# Patient Record
Sex: FEMALE | Race: WHITE | NOT HISPANIC OR LATINO | Employment: UNEMPLOYED | URBAN - METROPOLITAN AREA
[De-identification: names, ages, dates, MRNs, and addresses within clinical notes are randomized per-mention and may not be internally consistent; named-entity substitution may affect disease eponyms.]

---

## 2023-01-01 ENCOUNTER — PATIENT OUTREACH (OUTPATIENT)
Dept: PRIMARY CARE | Facility: CLINIC | Age: 88
End: 2023-01-01
Payer: MEDICARE

## 2023-01-01 ENCOUNTER — APPOINTMENT (OUTPATIENT)
Dept: PODIATRY | Facility: CLINIC | Age: 88
End: 2023-01-01
Payer: MEDICARE

## 2023-01-01 ENCOUNTER — TELEPHONE (OUTPATIENT)
Dept: PRIMARY CARE | Facility: CLINIC | Age: 88
End: 2023-01-01
Payer: MEDICARE

## 2023-01-01 ENCOUNTER — OFFICE VISIT (OUTPATIENT)
Dept: PRIMARY CARE | Facility: CLINIC | Age: 88
End: 2023-01-01
Payer: MEDICARE

## 2023-01-01 ENCOUNTER — LAB (OUTPATIENT)
Dept: LAB | Facility: LAB | Age: 88
End: 2023-01-01
Payer: MEDICARE

## 2023-01-01 VITALS
SYSTOLIC BLOOD PRESSURE: 118 MMHG | RESPIRATION RATE: 20 BRPM | TEMPERATURE: 97.7 F | HEART RATE: 64 BPM | DIASTOLIC BLOOD PRESSURE: 62 MMHG | BODY MASS INDEX: 24.76 KG/M2 | WEIGHT: 107 LBS | HEIGHT: 55 IN

## 2023-01-01 VITALS
HEART RATE: 62 BPM | WEIGHT: 108 LBS | BODY MASS INDEX: 24.99 KG/M2 | HEIGHT: 55 IN | RESPIRATION RATE: 18 BRPM | DIASTOLIC BLOOD PRESSURE: 70 MMHG | TEMPERATURE: 97.9 F | SYSTOLIC BLOOD PRESSURE: 110 MMHG

## 2023-01-01 DIAGNOSIS — N18.9 ANEMIA DUE TO CHRONIC KIDNEY DISEASE, UNSPECIFIED CKD STAGE: ICD-10-CM

## 2023-01-01 DIAGNOSIS — E55.9 VITAMIN D DEFICIENCY: ICD-10-CM

## 2023-01-01 DIAGNOSIS — Z23 IMMUNIZATION DUE: ICD-10-CM

## 2023-01-01 DIAGNOSIS — E03.9 ACQUIRED HYPOTHYROIDISM: ICD-10-CM

## 2023-01-01 DIAGNOSIS — E53.8 VITAMIN B12 DEFICIENCY: ICD-10-CM

## 2023-01-01 DIAGNOSIS — R09.02 HYPOXIA: Primary | ICD-10-CM

## 2023-01-01 DIAGNOSIS — E44.0 MODERATE PROTEIN-CALORIE MALNUTRITION (MULTI): ICD-10-CM

## 2023-01-01 DIAGNOSIS — D63.1 ANEMIA DUE TO CHRONIC KIDNEY DISEASE, UNSPECIFIED CKD STAGE: ICD-10-CM

## 2023-01-01 DIAGNOSIS — Z00.00 HEALTHCARE MAINTENANCE: ICD-10-CM

## 2023-01-01 DIAGNOSIS — J84.112 IPF (IDIOPATHIC PULMONARY FIBROSIS) (MULTI): ICD-10-CM

## 2023-01-01 DIAGNOSIS — Z00.00 MEDICARE ANNUAL WELLNESS VISIT, SUBSEQUENT: Primary | ICD-10-CM

## 2023-01-01 DIAGNOSIS — I48.11 LONGSTANDING PERSISTENT ATRIAL FIBRILLATION (MULTI): ICD-10-CM

## 2023-01-01 DIAGNOSIS — C49.A2 MALIGNANT GASTROINTESTINAL STROMAL TUMOR (GIST) OF STOMACH (MULTI): ICD-10-CM

## 2023-01-01 DIAGNOSIS — I10 ESSENTIAL HYPERTENSION, BENIGN: ICD-10-CM

## 2023-01-01 DIAGNOSIS — R53.81 PHYSICAL DEBILITY: ICD-10-CM

## 2023-01-01 DIAGNOSIS — I11.0 HYPERTENSIVE HEART DISEASE WITH HEART FAILURE (MULTI): ICD-10-CM

## 2023-01-01 LAB
ALANINE AMINOTRANSFERASE (SGPT) (U/L) IN SER/PLAS: 10 U/L (ref 7–45)
ALBUMIN (G/DL) IN SER/PLAS: 4.3 G/DL (ref 3.4–5)
ALKALINE PHOSPHATASE (U/L) IN SER/PLAS: 56 U/L (ref 33–136)
ANION GAP IN SER/PLAS: 15 MMOL/L (ref 10–20)
ASPARTATE AMINOTRANSFERASE (SGOT) (U/L) IN SER/PLAS: 17 U/L (ref 9–39)
BASOPHILS (10*3/UL) IN BLOOD BY AUTOMATED COUNT: 0.07 X10E9/L (ref 0–0.1)
BASOPHILS/100 LEUKOCYTES IN BLOOD BY AUTOMATED COUNT: 1 % (ref 0–2)
BILIRUBIN TOTAL (MG/DL) IN SER/PLAS: 0.4 MG/DL (ref 0–1.2)
CALCIDIOL (25 OH VITAMIN D3) (NG/ML) IN SER/PLAS: 40 NG/ML
CALCIUM (MG/DL) IN SER/PLAS: 9.4 MG/DL (ref 8.6–10.3)
CARBON DIOXIDE, TOTAL (MMOL/L) IN SER/PLAS: 28 MMOL/L (ref 21–32)
CHLORIDE (MMOL/L) IN SER/PLAS: 105 MMOL/L (ref 98–107)
COBALAMIN (VITAMIN B12) (PG/ML) IN SER/PLAS: 220 PG/ML (ref 211–911)
CREATININE (MG/DL) IN SER/PLAS: 0.92 MG/DL (ref 0.5–1.05)
EOSINOPHILS (10*3/UL) IN BLOOD BY AUTOMATED COUNT: 0.09 X10E9/L (ref 0–0.4)
EOSINOPHILS/100 LEUKOCYTES IN BLOOD BY AUTOMATED COUNT: 1.3 % (ref 0–6)
ERYTHROCYTE DISTRIBUTION WIDTH (RATIO) BY AUTOMATED COUNT: 14.6 % (ref 11.5–14.5)
ERYTHROCYTE MEAN CORPUSCULAR HEMOGLOBIN CONCENTRATION (G/DL) BY AUTOMATED: 31.4 G/DL (ref 32–36)
ERYTHROCYTE MEAN CORPUSCULAR VOLUME (FL) BY AUTOMATED COUNT: 98 FL (ref 80–100)
ERYTHROCYTES (10*6/UL) IN BLOOD BY AUTOMATED COUNT: 4.25 X10E12/L (ref 4–5.2)
GFR FEMALE: 55 ML/MIN/1.73M2
GLUCOSE (MG/DL) IN SER/PLAS: 98 MG/DL (ref 74–99)
HEMATOCRIT (%) IN BLOOD BY AUTOMATED COUNT: 41.7 % (ref 36–46)
HEMOGLOBIN (G/DL) IN BLOOD: 13.1 G/DL (ref 12–16)
HEPATITIS C VIRUS AB PRESENCE IN SERUM: NONREACTIVE
IMMATURE GRANULOCYTES/100 LEUKOCYTES IN BLOOD BY AUTOMATED COUNT: 0.4 % (ref 0–0.9)
LEUKOCYTES (10*3/UL) IN BLOOD BY AUTOMATED COUNT: 6.7 X10E9/L (ref 4.4–11.3)
LYMPHOCYTES (10*3/UL) IN BLOOD BY AUTOMATED COUNT: 1.11 X10E9/L (ref 0.8–3)
LYMPHOCYTES/100 LEUKOCYTES IN BLOOD BY AUTOMATED COUNT: 16.5 % (ref 13–44)
MAGNESIUM (MG/DL) IN SER/PLAS: 2.14 MG/DL (ref 1.6–2.4)
MONOCYTES (10*3/UL) IN BLOOD BY AUTOMATED COUNT: 0.57 X10E9/L (ref 0.05–0.8)
MONOCYTES/100 LEUKOCYTES IN BLOOD BY AUTOMATED COUNT: 8.5 % (ref 2–10)
NEUTROPHILS (10*3/UL) IN BLOOD BY AUTOMATED COUNT: 4.87 X10E9/L (ref 1.6–5.5)
NEUTROPHILS/100 LEUKOCYTES IN BLOOD BY AUTOMATED COUNT: 72.3 % (ref 40–80)
PLATELETS (10*3/UL) IN BLOOD AUTOMATED COUNT: 244 X10E9/L (ref 150–450)
POTASSIUM (MMOL/L) IN SER/PLAS: 4.7 MMOL/L (ref 3.5–5.3)
PROTEIN TOTAL: 6.7 G/DL (ref 6.4–8.2)
SODIUM (MMOL/L) IN SER/PLAS: 143 MMOL/L (ref 136–145)
THYROTROPIN (MIU/L) IN SER/PLAS BY DETECTION LIMIT <= 0.05 MIU/L: 1.13 MIU/L (ref 0.44–3.98)
UREA NITROGEN (MG/DL) IN SER/PLAS: 14 MG/DL (ref 6–23)

## 2023-01-01 PROCEDURE — G0439 PPPS, SUBSEQ VISIT: HCPCS | Performed by: INTERNAL MEDICINE

## 2023-01-01 PROCEDURE — 3078F DIAST BP <80 MM HG: CPT | Performed by: INTERNAL MEDICINE

## 2023-01-01 PROCEDURE — 83735 ASSAY OF MAGNESIUM: CPT

## 2023-01-01 PROCEDURE — G0008 ADMIN INFLUENZA VIRUS VAC: HCPCS | Performed by: INTERNAL MEDICINE

## 2023-01-01 PROCEDURE — 90662 IIV NO PRSV INCREASED AG IM: CPT | Performed by: INTERNAL MEDICINE

## 2023-01-01 PROCEDURE — 1159F MED LIST DOCD IN RCRD: CPT | Performed by: INTERNAL MEDICINE

## 2023-01-01 PROCEDURE — 99214 OFFICE O/P EST MOD 30 MIN: CPT | Performed by: INTERNAL MEDICINE

## 2023-01-01 PROCEDURE — 1157F ADVNC CARE PLAN IN RCRD: CPT | Performed by: INTERNAL MEDICINE

## 2023-01-01 PROCEDURE — 1160F RVW MEDS BY RX/DR IN RCRD: CPT | Performed by: INTERNAL MEDICINE

## 2023-01-01 PROCEDURE — 84443 ASSAY THYROID STIM HORMONE: CPT

## 2023-01-01 PROCEDURE — 80053 COMPREHEN METABOLIC PANEL: CPT

## 2023-01-01 PROCEDURE — 82607 VITAMIN B-12: CPT

## 2023-01-01 PROCEDURE — 1125F AMNT PAIN NOTED PAIN PRSNT: CPT | Performed by: INTERNAL MEDICINE

## 2023-01-01 PROCEDURE — 1170F FXNL STATUS ASSESSED: CPT | Performed by: INTERNAL MEDICINE

## 2023-01-01 PROCEDURE — 85025 COMPLETE CBC W/AUTO DIFF WBC: CPT

## 2023-01-01 PROCEDURE — 82306 VITAMIN D 25 HYDROXY: CPT

## 2023-01-01 PROCEDURE — 3074F SYST BP LT 130 MM HG: CPT | Performed by: INTERNAL MEDICINE

## 2023-01-01 PROCEDURE — 36415 COLL VENOUS BLD VENIPUNCTURE: CPT

## 2023-01-01 PROCEDURE — 1036F TOBACCO NON-USER: CPT | Performed by: INTERNAL MEDICINE

## 2023-01-01 PROCEDURE — 99213 OFFICE O/P EST LOW 20 MIN: CPT | Performed by: INTERNAL MEDICINE

## 2023-01-01 PROCEDURE — 86803 HEPATITIS C AB TEST: CPT

## 2023-01-01 RX ORDER — GUAIFENESIN 600 MG/1
1200 TABLET, EXTENDED RELEASE ORAL 2 TIMES DAILY
COMMUNITY
Start: 2023-01-01 | End: 2023-11-21

## 2023-01-01 RX ORDER — AMLODIPINE BESYLATE 5 MG/1
1 TABLET ORAL DAILY
COMMUNITY
Start: 2022-02-25

## 2023-01-01 RX ORDER — ADHESIVE BANDAGE
BANDAGE TOPICAL
COMMUNITY
Start: 2022-06-22

## 2023-01-01 RX ORDER — IPRATROPIUM BROMIDE AND ALBUTEROL SULFATE 2.5; .5 MG/3ML; MG/3ML
3 SOLUTION RESPIRATORY (INHALATION)
COMMUNITY

## 2023-01-01 RX ORDER — LEVOTHYROXINE SODIUM 50 UG/1
TABLET ORAL
COMMUNITY
Start: 2017-06-21

## 2023-01-01 RX ORDER — BRIMONIDINE TARTRATE 1.5 MG/ML
1 SOLUTION/ DROPS OPHTHALMIC 2 TIMES DAILY
Qty: 15 ML | Refills: 0 | Status: SHIPPED | OUTPATIENT
Start: 2023-01-01 | End: 2023-11-15

## 2023-01-01 RX ORDER — METOPROLOL TARTRATE 25 MG/1
25 TABLET, FILM COATED ORAL EVERY 12 HOURS
COMMUNITY
Start: 2018-06-19

## 2023-01-01 RX ORDER — ALBUTEROL SULFATE 90 UG/1
AEROSOL, METERED RESPIRATORY (INHALATION)
COMMUNITY
Start: 2023-01-01

## 2023-01-01 RX ORDER — AZITHROMYCIN 250 MG/1
250 TABLET, FILM COATED ORAL DAILY
COMMUNITY
Start: 2023-01-01 | End: 2023-01-01

## 2023-01-01 RX ORDER — ACETAMINOPHEN 325 MG/1
TABLET ORAL
COMMUNITY
Start: 2022-06-22

## 2023-01-01 RX ORDER — BIMATOPROST 0.1 MG/ML
1 SOLUTION/ DROPS OPHTHALMIC NIGHTLY
Qty: 2.5 ML | Refills: 1 | Status: SHIPPED | OUTPATIENT
Start: 2023-01-01 | End: 2023-11-15

## 2023-01-01 RX ORDER — BENZONATATE 100 MG/1
100 CAPSULE ORAL 3 TIMES DAILY PRN
COMMUNITY

## 2023-01-01 RX ORDER — BIMATOPROST 0.1 MG/ML
1 SOLUTION/ DROPS OPHTHALMIC NIGHTLY
COMMUNITY
Start: 2023-01-01 | End: 2023-01-01 | Stop reason: SDUPTHER

## 2023-01-01 RX ORDER — TALC
3 POWDER (GRAM) TOPICAL NIGHTLY
COMMUNITY

## 2023-01-01 RX ORDER — CALCIUM CARBONATE 500(1250)
1 TABLET ORAL
COMMUNITY

## 2023-01-01 RX ORDER — VITAMIN B COMPLEX
1 CAPSULE ORAL DAILY
Qty: 30 CAPSULE | Refills: 11 | Status: SHIPPED | OUTPATIENT
Start: 2023-01-01 | End: 2023-11-15

## 2023-01-01 RX ORDER — ACETAMINOPHEN 500 MG
2 TABLET ORAL DAILY
COMMUNITY
Start: 2022-07-21 | End: 2023-01-01 | Stop reason: ALTCHOICE

## 2023-01-01 RX ORDER — ACETAMINOPHEN 500 MG
1 TABLET ORAL DAILY
COMMUNITY
Start: 2022-07-21

## 2023-01-01 RX ORDER — HYDROXYZINE HYDROCHLORIDE 25 MG/1
25 TABLET, FILM COATED ORAL 3 TIMES DAILY
COMMUNITY
Start: 2023-01-01 | End: 2023-11-21

## 2023-01-01 RX ORDER — BRIMONIDINE TARTRATE 1 MG/ML
SOLUTION/ DROPS OPHTHALMIC
COMMUNITY
Start: 2018-10-30

## 2023-01-01 RX ORDER — BROMFENAC SODIUM 0.7 MG/ML
SOLUTION/ DROPS OPHTHALMIC
COMMUNITY
Start: 2023-01-01

## 2023-01-01 RX ORDER — PREDNISONE 10 MG/1
10 TABLET ORAL DAILY
COMMUNITY

## 2023-01-01 RX ORDER — BRIMONIDINE TARTRATE 1.5 MG/ML
SOLUTION/ DROPS OPHTHALMIC
COMMUNITY
Start: 2022-07-01 | End: 2023-01-01 | Stop reason: SDUPTHER

## 2023-01-01 ASSESSMENT — ENCOUNTER SYMPTOMS
FATIGUE: 0
COUGH: 0
PALPITATIONS: 0
VOMITING: 0
CHEST TIGHTNESS: 0
CHILLS: 0
VOMITING: 0
UNEXPECTED WEIGHT CHANGE: 0
CONSTIPATION: 0
CONSTIPATION: 0
ABDOMINAL PAIN: 0
CONFUSION: 0
WHEEZING: 1
ARTHRALGIAS: 0
CONFUSION: 0
DYSURIA: 0
SLEEP DISTURBANCE: 0
NAUSEA: 0
UNEXPECTED WEIGHT CHANGE: 0
COUGH: 1
DIZZINESS: 0
WEAKNESS: 0
DYSURIA: 0
SLEEP DISTURBANCE: 0
CHILLS: 0
NAUSEA: 0
JOINT SWELLING: 0
ADENOPATHY: 0
CHEST TIGHTNESS: 0
ADENOPATHY: 0
DIZZINESS: 0
ARTHRALGIAS: 0
SORE THROAT: 0
SHORTNESS OF BREATH: 1
PALPITATIONS: 0
JOINT SWELLING: 0
DIARRHEA: 0
FATIGUE: 1
SHORTNESS OF BREATH: 0
DIARRHEA: 0
WEAKNESS: 0
ABDOMINAL PAIN: 0
WHEEZING: 0
SORE THROAT: 0
HEADACHES: 0

## 2023-01-01 ASSESSMENT — PATIENT HEALTH QUESTIONNAIRE - PHQ9
SUM OF ALL RESPONSES TO PHQ9 QUESTIONS 1 AND 2: 0
SUM OF ALL RESPONSES TO PHQ9 QUESTIONS 1 AND 2: 0
2. FEELING DOWN, DEPRESSED OR HOPELESS: NOT AT ALL
2. FEELING DOWN, DEPRESSED OR HOPELESS: NOT AT ALL
1. LITTLE INTEREST OR PLEASURE IN DOING THINGS: NOT AT ALL
1. LITTLE INTEREST OR PLEASURE IN DOING THINGS: NOT AT ALL

## 2023-01-01 ASSESSMENT — ACTIVITIES OF DAILY LIVING (ADL)
BATHING: NEEDS ASSISTANCE
DOING_HOUSEWORK: NEEDS ASSISTANCE
DRESSING: INDEPENDENT
GROCERY_SHOPPING: NEEDS ASSISTANCE
TAKING_MEDICATION: NEEDS ASSISTANCE
MANAGING_FINANCES: NEEDS ASSISTANCE

## 2023-06-01 PROBLEM — L50.9 NAUSEA WITH HIVES: Status: ACTIVE | Noted: 2023-01-01

## 2023-06-01 PROBLEM — E53.8 COBALAMIN DEFICIENCY: Status: ACTIVE | Noted: 2023-01-01

## 2023-06-01 PROBLEM — I48.91 ATRIAL FIBRILLATION (MULTI): Status: ACTIVE | Noted: 2023-01-01

## 2023-06-01 PROBLEM — M79.676 TOE PAIN, CHRONIC: Status: ACTIVE | Noted: 2023-01-01

## 2023-06-01 PROBLEM — Z86.79 HISTORY OF CARDIAC ARRHYTHMIA: Status: ACTIVE | Noted: 2023-01-01

## 2023-06-01 PROBLEM — R32 URINARY INCONTINENCE: Status: ACTIVE | Noted: 2023-01-01

## 2023-06-01 PROBLEM — C44.90 SKIN CANCER: Status: ACTIVE | Noted: 2023-01-01

## 2023-06-01 PROBLEM — C44.91 BASAL CELL CARCINOMA: Status: ACTIVE | Noted: 2023-01-01

## 2023-06-01 PROBLEM — S32.000A COMPRESSION FRACTURE OF LUMBAR VERTEBRA (MULTI): Status: ACTIVE | Noted: 2023-01-01

## 2023-06-01 PROBLEM — E78.5 HYPERLIPIDEMIA: Status: RESOLVED | Noted: 2023-01-01 | Resolved: 2023-01-01

## 2023-06-01 PROBLEM — M54.30 SCIATICA: Status: ACTIVE | Noted: 2022-06-21

## 2023-06-01 PROBLEM — M79.674 CHRONIC PAIN OF TOE OF RIGHT FOOT: Status: ACTIVE | Noted: 2023-01-01

## 2023-06-01 PROBLEM — I87.2 VENOUS INSUFFICIENCY: Status: ACTIVE | Noted: 2023-01-01

## 2023-06-01 PROBLEM — L21.9 SEBORRHEIC DERMATITIS: Status: ACTIVE | Noted: 2023-01-01

## 2023-06-01 PROBLEM — K27.9 PUD (PEPTIC ULCER DISEASE): Status: RESOLVED | Noted: 2023-01-01 | Resolved: 2023-01-01

## 2023-06-01 PROBLEM — H35.30 MACULAR DEGENERATION: Status: ACTIVE | Noted: 2023-01-01

## 2023-06-01 PROBLEM — H40.9 GLAUCOMA: Status: ACTIVE | Noted: 2023-01-01

## 2023-06-01 PROBLEM — J84.10 FIBROSIS OF LUNG (MULTI): Status: RESOLVED | Noted: 2023-01-01 | Resolved: 2023-01-01

## 2023-06-01 PROBLEM — M54.32 SCIATICA OF LEFT SIDE: Status: ACTIVE | Noted: 2023-01-01

## 2023-06-01 PROBLEM — R09.02 HYPOXIA: Status: ACTIVE | Noted: 2023-01-01

## 2023-06-01 PROBLEM — I61.9 CVA (CEREBROVASCULAR ACCIDENT DUE TO INTRACEREBRAL HEMORRHAGE) (MULTI): Status: ACTIVE | Noted: 2023-01-01

## 2023-06-01 PROBLEM — N39.0 RECURRENT UTI: Status: RESOLVED | Noted: 2023-01-01 | Resolved: 2023-01-01

## 2023-06-01 PROBLEM — S32.010A COMPRESSION FRACTURE OF L1 LUMBAR VERTEBRA (MULTI): Status: ACTIVE | Noted: 2023-01-01

## 2023-06-01 PROBLEM — I61.9 CEREBRAL HEMORRHAGE (MULTI): Status: ACTIVE | Noted: 2023-01-01

## 2023-06-01 PROBLEM — R06.02 SHORTNESS OF BREATH: Status: ACTIVE | Noted: 2023-01-01

## 2023-06-01 PROBLEM — R42 LIGHTHEADEDNESS: Status: ACTIVE | Noted: 2023-01-01

## 2023-06-01 PROBLEM — D21.4 GIST (GASTROINTESTINAL STROMAL TUMOR), NON-MALIGNANT: Status: ACTIVE | Noted: 2023-01-01

## 2023-06-01 PROBLEM — K27.9 PEPTIC ULCER: Status: RESOLVED | Noted: 2023-01-01 | Resolved: 2023-01-01

## 2023-06-01 PROBLEM — E03.9 HYPOTHYROIDISM: Status: ACTIVE | Noted: 2023-01-01

## 2023-06-01 PROBLEM — G89.29 CHRONIC BACK PAIN: Status: ACTIVE | Noted: 2023-01-01

## 2023-06-01 PROBLEM — E55.9 VITAMIN D DEFICIENCY: Status: ACTIVE | Noted: 2023-01-01

## 2023-06-01 PROBLEM — R53.81 PHYSICAL DEBILITY: Status: ACTIVE | Noted: 2023-01-01

## 2023-06-01 PROBLEM — F41.9 ANXIETY: Status: ACTIVE | Noted: 2023-01-01

## 2023-06-01 PROBLEM — G45.9 TIA (TRANSIENT ISCHEMIC ATTACK): Status: ACTIVE | Noted: 2023-01-01

## 2023-06-01 PROBLEM — N39.0 RECURRENT UTI: Status: ACTIVE | Noted: 2023-01-01

## 2023-06-01 PROBLEM — H40.053 OCULAR HYPERTENSION, BILATERAL: Status: ACTIVE | Noted: 2018-10-20

## 2023-06-01 PROBLEM — L72.0 EPIDERMOID CYST OF SKIN: Status: ACTIVE | Noted: 2023-01-01

## 2023-06-01 PROBLEM — K27.9 PUD (PEPTIC ULCER DISEASE): Status: ACTIVE | Noted: 2023-01-01

## 2023-06-01 PROBLEM — K27.9 PEPTIC ULCER: Status: ACTIVE | Noted: 2023-01-01

## 2023-06-01 PROBLEM — M25.562 ACUTE PAIN OF LEFT KNEE: Status: ACTIVE | Noted: 2023-01-01

## 2023-06-01 PROBLEM — R30.0 DYSURIA: Status: ACTIVE | Noted: 2023-01-01

## 2023-06-01 PROBLEM — R39.9 UTI SYMPTOMS: Status: ACTIVE | Noted: 2023-01-01

## 2023-06-01 PROBLEM — B35.1 ONYCHOMYCOSIS: Status: ACTIVE | Noted: 2023-01-01

## 2023-06-01 PROBLEM — F32.A DEPRESSIVE DISORDER: Status: ACTIVE | Noted: 2023-01-01

## 2023-06-01 PROBLEM — N39.0 RECURRENT URINARY TRACT INFECTION: Status: RESOLVED | Noted: 2023-01-01 | Resolved: 2023-01-01

## 2023-06-01 PROBLEM — D21.4 BENIGN GASTROINTESTINAL STROMAL TUMOR (GIST): Status: ACTIVE | Noted: 2023-01-01

## 2023-06-01 PROBLEM — R04.0 EPISTAXIS: Status: ACTIVE | Noted: 2023-01-01

## 2023-06-01 PROBLEM — R06.2 WHEEZING: Status: ACTIVE | Noted: 2023-01-01

## 2023-06-01 PROBLEM — K44.9 HIATAL HERNIA: Status: ACTIVE | Noted: 2023-01-01

## 2023-06-01 PROBLEM — L72.0 EPIDERMAL CYST: Status: ACTIVE | Noted: 2023-01-01

## 2023-06-01 PROBLEM — G89.29 CHRONIC PAIN OF TOE OF RIGHT FOOT: Status: ACTIVE | Noted: 2023-01-01

## 2023-06-01 PROBLEM — H02.409 PTOSIS: Status: ACTIVE | Noted: 2017-05-31

## 2023-06-01 PROBLEM — G89.29 CHRONIC PAIN OF TOE OF LEFT FOOT: Status: ACTIVE | Noted: 2023-01-01

## 2023-06-01 PROBLEM — H40.023 OAG (OPEN ANGLE GLAUCOMA) SUSPECT, HIGH RISK, BILATERAL: Status: ACTIVE | Noted: 2017-04-12

## 2023-06-01 PROBLEM — R19.5 OCCULT BLOOD IN STOOLS: Status: ACTIVE | Noted: 2023-01-01

## 2023-06-01 PROBLEM — G47.00 INSOMNIA: Status: ACTIVE | Noted: 2023-01-01

## 2023-06-01 PROBLEM — E78.5 DYSLIPIDEMIA: Status: ACTIVE | Noted: 2022-06-21

## 2023-06-01 PROBLEM — M81.0 OSTEOPOROSIS: Status: ACTIVE | Noted: 2023-01-01

## 2023-06-01 PROBLEM — G45.9 TRANSIENT ISCHEMIC ATTACK: Status: ACTIVE | Noted: 2023-01-01

## 2023-06-01 PROBLEM — H01.003 BLEPHARITIS OF EYELIDS OF BOTH EYES DUE TO STAPHYLOCOCCUS: Status: ACTIVE | Noted: 2018-10-20

## 2023-06-01 PROBLEM — R06.89 BREATHING DIFFICULTY: Status: ACTIVE | Noted: 2023-01-01

## 2023-06-01 PROBLEM — E53.8 VITAMIN B12 DEFICIENCY: Status: ACTIVE | Noted: 2023-01-01

## 2023-06-01 PROBLEM — G89.29 TOE PAIN, CHRONIC: Status: ACTIVE | Noted: 2023-01-01

## 2023-06-01 PROBLEM — R06.89 BREATHING DIFFICULTY: Status: RESOLVED | Noted: 2023-01-01 | Resolved: 2023-01-01

## 2023-06-01 PROBLEM — C43.9 MALIGNANT MELANOMA (MULTI): Status: ACTIVE | Noted: 2023-01-01

## 2023-06-01 PROBLEM — D21.4 BENIGN GASTROINTESTINAL STROMAL TUMOR (GIST): Status: RESOLVED | Noted: 2023-01-01 | Resolved: 2023-01-01

## 2023-06-01 PROBLEM — D21.4 GIST (GASTROINTESTINAL STROMAL TUMOR), NON-MALIGNANT: Status: RESOLVED | Noted: 2023-01-01 | Resolved: 2023-01-01

## 2023-06-01 PROBLEM — I51.89 DIASTOLIC DYSFUNCTION: Status: ACTIVE | Noted: 2023-01-01

## 2023-06-01 PROBLEM — I65.29 STENOSIS OF CAROTID ARTERY: Status: ACTIVE | Noted: 2023-01-01

## 2023-06-01 PROBLEM — M19.90 OSTEOARTHRITIS: Status: ACTIVE | Noted: 2023-01-01

## 2023-06-01 PROBLEM — M25.569 KNEE PAIN: Status: ACTIVE | Noted: 2023-01-01

## 2023-06-01 PROBLEM — J84.112 IPF (IDIOPATHIC PULMONARY FIBROSIS) (MULTI): Status: ACTIVE | Noted: 2023-01-01

## 2023-06-01 PROBLEM — L98.9 SKIN LESION: Status: ACTIVE | Noted: 2023-01-01

## 2023-06-01 PROBLEM — L57.0 ACTINIC KERATOSES: Status: ACTIVE | Noted: 2023-01-01

## 2023-06-01 PROBLEM — M40.209 KYPHOSIS: Status: ACTIVE | Noted: 2023-01-01

## 2023-06-01 PROBLEM — K92.1 BLACK STOOL: Status: ACTIVE | Noted: 2023-01-01

## 2023-06-01 PROBLEM — R53.1 WEAKNESS: Status: ACTIVE | Noted: 2023-01-01

## 2023-06-01 PROBLEM — H54.8 LEGAL BLINDNESS, AS DEFINED IN USA: Status: ACTIVE | Noted: 2017-04-12

## 2023-06-01 PROBLEM — R53.83 FATIGUE: Status: ACTIVE | Noted: 2023-01-01

## 2023-06-01 PROBLEM — D64.9 ANEMIA: Status: ACTIVE | Noted: 2023-01-01

## 2023-06-01 PROBLEM — R06.2 WHEEZING: Status: RESOLVED | Noted: 2023-01-01 | Resolved: 2023-01-01

## 2023-06-01 PROBLEM — M17.12 PRIMARY OSTEOARTHRITIS OF LEFT KNEE: Status: ACTIVE | Noted: 2023-01-01

## 2023-06-01 PROBLEM — E78.5 HYPERLIPIDEMIA: Status: ACTIVE | Noted: 2023-01-01

## 2023-06-01 PROBLEM — I34.1 MVP (MITRAL VALVE PROLAPSE): Status: ACTIVE | Noted: 2023-01-01

## 2023-06-01 PROBLEM — B95.8 BLEPHARITIS OF EYELIDS OF BOTH EYES DUE TO STAPHYLOCOCCUS: Status: ACTIVE | Noted: 2018-10-20

## 2023-06-01 PROBLEM — J84.10 FIBROSIS OF LUNG (MULTI): Status: ACTIVE | Noted: 2023-01-01

## 2023-06-01 PROBLEM — R39.9 UTI SYMPTOMS: Status: RESOLVED | Noted: 2023-01-01 | Resolved: 2023-01-01

## 2023-06-01 PROBLEM — M54.9 CHRONIC BACK PAIN: Status: ACTIVE | Noted: 2023-01-01

## 2023-06-01 PROBLEM — R42 DIZZINESS: Status: ACTIVE | Noted: 2023-01-01

## 2023-06-01 PROBLEM — R11.0 NAUSEA: Status: ACTIVE | Noted: 2023-01-01

## 2023-06-01 PROBLEM — D18.01 ANGIOMA OF SKIN: Status: ACTIVE | Noted: 2023-01-01

## 2023-06-01 PROBLEM — H01.006 BLEPHARITIS OF EYELIDS OF BOTH EYES DUE TO STAPHYLOCOCCUS: Status: ACTIVE | Noted: 2018-10-20

## 2023-06-01 PROBLEM — K11.8 MASS OF LEFT PAROTID GLAND: Status: ACTIVE | Noted: 2023-01-01

## 2023-06-01 PROBLEM — H61.20 CERUMEN IMPACTION: Status: ACTIVE | Noted: 2023-01-01

## 2023-06-01 PROBLEM — R11.0 NAUSEA WITH HIVES: Status: ACTIVE | Noted: 2023-01-01

## 2023-06-01 PROBLEM — Z98.890 HISTORY OF SPINAL SURGERY: Status: ACTIVE | Noted: 2023-01-01

## 2023-06-01 PROBLEM — N39.0 RECURRENT URINARY TRACT INFECTION: Status: ACTIVE | Noted: 2023-01-01

## 2023-06-01 PROBLEM — M79.675 CHRONIC PAIN OF TOE OF LEFT FOOT: Status: ACTIVE | Noted: 2023-01-01

## 2023-06-01 PROBLEM — R30.0 DYSURIA: Status: RESOLVED | Noted: 2023-01-01 | Resolved: 2023-01-01

## 2023-06-01 PROBLEM — M25.562 ACUTE PAIN OF LEFT KNEE: Status: RESOLVED | Noted: 2023-01-01 | Resolved: 2023-01-01

## 2023-06-01 NOTE — PROGRESS NOTES
Subjective   Johanna Bowen is a 89 y.o. female who presents for Follow-up (6 months follow up).  6 months follow up   Feeling SOB at times more than before, increase on frequency and severity, using oxygen more often 2.5 L, today oxygen level post walking was 83% after resting 92% at [facility]   Taking more naps in AM and PM then before   More weakness in her back , wants to seat in the Rolator than to push it, and gets SOB and can not keep walking ,might need a wheelchair   Niece Laura is in the room today with patient       Review of Systems   Constitutional:  Positive for fatigue. Negative for chills and unexpected weight change.        Comment   HENT:  Negative for congestion, ear pain and sore throat.    Respiratory:  Positive for cough, shortness of breath and wheezing. Negative for chest tightness.    Cardiovascular:  Negative for palpitations and leg swelling.   Gastrointestinal:  Negative for abdominal pain, constipation, diarrhea, nausea and vomiting.   Genitourinary:  Negative for dysuria and urgency.   Musculoskeletal:  Negative for arthralgias and joint swelling.   Skin:  Negative for rash.   Neurological:  Negative for dizziness, weakness and headaches.   Hematological:  Negative for adenopathy.   Psychiatric/Behavioral:  Negative for confusion and sleep disturbance.        Objective   Physical Exam  Constitutional:       Appearance: Normal appearance.   HENT:      Head: Normocephalic and atraumatic.   Eyes:      Pupils: Pupils are equal, round, and reactive to light.   Cardiovascular:      Rate and Rhythm: Normal rate and regular rhythm.   Pulmonary:      Effort: Pulmonary effort is normal.      Breath sounds: Normal breath sounds.   Musculoskeletal:         General: Normal range of motion.      Cervical back: Normal range of motion and neck supple.   Skin:     General: Skin is warm.   Neurological:      General: No focal deficit present.      Mental Status: She is alert and oriented to person, place,  "and time.   Psychiatric:         Mood and Affect: Mood normal.         Behavior: Behavior normal.       /70 (BP Location: Left arm, Patient Position: Sitting)   Pulse 62   Temp 36.6 °C (97.9 °F)   Resp 18   Ht 1.346 m (4' 5\")   Wt 49 kg (108 lb)   BMI 27.03 kg/m²       Assessment/Plan   Problem List Items Addressed This Visit       Anemia    Relevant Orders    CBC and Auto Differential    Comprehensive Metabolic Panel    Atrial fibrillation (CMS/HCC)     Not on anticoagulation         Relevant Medications    amLODIPine (Norvasc) 5 mg tablet    metoprolol tartrate (Lopressor) 25 mg tablet    Essential hypertension, benign    GIST (gastrointestinal stromal tumor), malignant (CMS/HCC)     Had sx in the past         Hypothyroidism    Relevant Orders    TSH with reflex to Free T4 if abnormal    Magnesium    Hepatitis C Antibody    Hypoxia - Primary    IPF (idiopathic pulmonary fibrosis) (CMS/HCC)     On home o2 , needs to use it more         Moderate protein-calorie malnutrition (CMS/HCC)    Vitamin B12 deficiency    Relevant Orders    Vitamin B12    Vitamin D deficiency    Relevant Orders    Vitamin D, Total     Might need a wheelchair in the future, using a walker with a sit now  "

## 2023-06-29 NOTE — TELEPHONE ENCOUNTER
Dorinda left a vm stating that Pt is having difficulty catching her breath and breathing even with the oxygen.    I called Dorinda back- she did not answer, but I did leave a detailed message instructing her to take Johanna to the ER, per Rohini.

## 2023-06-30 NOTE — TELEPHONE ENCOUNTER
[facility] called regarding:    Calling with vital signs    144/62 [phone]    142/89 [phone]     Pt is doing much better today and sitting outside.

## 2023-09-07 PROBLEM — I11.0 HYPERTENSIVE HEART DISEASE WITH HEART FAILURE (MULTI): Status: ACTIVE | Noted: 2023-01-01

## 2023-09-07 PROBLEM — Z23 IMMUNIZATION DUE: Status: ACTIVE | Noted: 2023-01-01

## 2023-09-07 PROBLEM — Z00.00 MEDICARE ANNUAL WELLNESS VISIT, SUBSEQUENT: Status: ACTIVE | Noted: 2023-01-01

## 2023-09-07 NOTE — PROGRESS NOTES
"Subjective   Reason for Visit: Johanna Bowen is an 89 y.o. female here for a Medicare Wellness visit.     Past Medical, Surgical, and Family History reviewed and updated in chart.    Reviewed all medications by prescribing practitioner or clinical pharmacist (such as prescriptions, OTCs, herbal therapies and supplements) and documented in the medical record.    Feeling better, eating better  In with her nice   Labs rev    Refusing any further test, seen by a dermatologist in the past    Patient Care Team:  Michelle Solomon MD as PCP - General  Michelle Solomon MD as PCP - [facility] Attributed Provider     Review of Systems   Constitutional:  Negative for chills, fatigue and unexpected weight change.        Comment   HENT:  Negative for congestion, ear pain and sore throat.    Respiratory:  Negative for cough, chest tightness, shortness of breath and wheezing.    Cardiovascular:  Negative for palpitations and leg swelling.   Gastrointestinal:  Negative for abdominal pain, constipation, diarrhea, nausea and vomiting.   Genitourinary:  Negative for dysuria and urgency.   Musculoskeletal:  Negative for arthralgias and joint swelling.   Skin:  Negative for rash.   Neurological:  Negative for dizziness and weakness.   Hematological:  Negative for adenopathy.   Psychiatric/Behavioral:  Negative for confusion and sleep disturbance.        Objective   Vitals:  /62   Pulse 64   Temp 36.5 °C (97.7 °F)   Resp 20   Ht 1.346 m (4' 5\")   Wt 48.5 kg (107 lb)   BMI 26.78 kg/m²       Physical Exam  Constitutional:       Appearance: Normal appearance.      Comments: Walking with a walker   HENT:      Head: Normocephalic and atraumatic.   Eyes:      Pupils: Pupils are equal, round, and reactive to light.   Cardiovascular:      Rate and Rhythm: Normal rate and regular rhythm.   Pulmonary:      Effort: Pulmonary effort is normal.      Breath sounds: Normal breath sounds.   Musculoskeletal:         General: Normal range " of motion.      Cervical back: Normal range of motion and neck supple.   Skin:     General: Skin is warm.   Neurological:      General: No focal deficit present.      Mental Status: She is alert and oriented to person, place, and time.   Psychiatric:         Mood and Affect: Mood normal.         Behavior: Behavior normal.         Assessment/Plan   Problem List Items Addressed This Visit       Physical debility    Current Assessment & Plan     Has difficulties getting around long distance, need a wheelchair to keep active and avoid falls         Relevant Orders    Wheelchair Lightweight    Vitamin B12 deficiency    Current Assessment & Plan     Start b complex         Relevant Medications    b complex vitamins capsule    Vitamin D deficiency    Medicare annual wellness visit, subsequent - Primary    Immunization due    Relevant Orders    Flu vaccine, quadrivalent, high-dose, preservative free, age 65y+ (FLUZONE)    Hypertensive heart disease with heart failure (CMS/[facility])

## 2023-11-08 NOTE — PROGRESS NOTES
Discharge Facility: [facility]  Discharge Diagnosis: SOB  Admission Date: 11/4/2023  Discharge Date: 11/7/2023    PCP Appointment Date: 11/20/2023  Specialist Appointment Date: pulmonary 11/27/2023  Hospital Encounter and Summary: Linked  See discharge assessment below for further details    Engagement  Call Start Time: 1109 (11/8/2023 11:09 AM)    Medications  Medications reviewed with patient/caregiver?: Yes (11/8/2023 11:09 AM)  Is the patient having any side effects they believe may be caused by any medication additions or changes?: No (11/8/2023 11:09 AM)  Does the patient have all medications ordered at discharge?: Yes (11/8/2023 11:09 AM)  Prescription Comments: new: duoneb, prednisone, hydroxyzine, zithromax, mucinex, melatonin, tessalon pearle, chloraseptic lozenges (11/8/2023 11:09 AM)  Is the patient taking all medications as directed (includes completed medication regime)?: Yes (11/8/2023 11:09 AM)  Medication Comments: see med list (11/8/2023 11:09 AM)    Appointments  Does the patient have a primary care provider?: Yes (11/8/2023 11:09 AM)  Care Management Interventions: Verified appointment date/time/provider (11/8/2023 11:09 AM)  Has the patient kept scheduled appointments due by today?: Yes (11/8/2023 11:09 AM)  Care Management Interventions: Advised patient to keep appointment (11/8/2023 11:09 AM)    Self Management  What is the home health agency?: none- lives in [facility] (11/8/2023 11:09 AM)  Has home health visited the patient within 72 hours of discharge?: Not applicable (11/8/2023 11:09 AM)    Patient Teaching  Does the patient have access to their discharge instructions?: Yes (11/8/2023 11:09 AM)  Care Management Interventions: Reviewed instructions with patient (reviewed with POA) (11/8/2023 11:09 AM)  What is the patient's perception of their health status since discharge?: Improving (11/8/2023 11:09 AM)  Is the patient/caregiver able to teach back the hierarchy of who to call/visit for  symptoms/problems? PCP, Specialist, Home Health nurse, Urgent Care, ED, 911: Yes (11/8/2023 11:09 AM)    Wrap Up  Wrap Up Additional Comments: CTS spoke with Lizbeth SHEEHAN). she stated that she had not seen patient today as of yet, however when she left her last night she was doing well. POA had no questions or concerns at this time. Has meds that were prescribed. Has follow up appts scheduled and is aware of the dates and times. She has my contact information if any needs arise. (11/8/2023 11:09 AM)  Call End Time: 1114 (11/8/2023 11:09 AM)

## 2023-11-13 ENCOUNTER — APPOINTMENT (OUTPATIENT)
Dept: PRIMARY CARE | Facility: CLINIC | Age: 88
End: 2023-11-13
Payer: MEDICARE

## 2023-11-14 ENCOUNTER — POST MORTEM DOCUMENTATION (OUTPATIENT)
Dept: PRIMARY CARE | Facility: CLINIC | Age: 88
End: 2023-11-14
Payer: MEDICARE

## 2023-11-14 ENCOUNTER — TELEPHONE (OUTPATIENT)
Dept: CARDIOLOGY | Facility: CLINIC | Age: 88
End: 2023-11-14
Payer: MEDICARE

## 2023-11-14 NOTE — TELEPHONE ENCOUNTER
Lacey called to let us know that Mrs. Bowen passed away on Saturday.....very peacefully.      I cancelled all her appointments but can no longer james her as , was told only physicians could do this in EPIC.    Thanks

## 2023-11-20 ENCOUNTER — APPOINTMENT (OUTPATIENT)
Dept: PRIMARY CARE | Facility: CLINIC | Age: 88
End: 2023-11-20
Payer: MEDICARE

## 2024-03-07 ENCOUNTER — APPOINTMENT (OUTPATIENT)
Dept: PRIMARY CARE | Facility: CLINIC | Age: 89
End: 2024-03-07
Payer: MEDICARE